# Patient Record
Sex: FEMALE | Race: WHITE | NOT HISPANIC OR LATINO | Employment: FULL TIME | URBAN - METROPOLITAN AREA
[De-identification: names, ages, dates, MRNs, and addresses within clinical notes are randomized per-mention and may not be internally consistent; named-entity substitution may affect disease eponyms.]

---

## 2017-08-15 ENCOUNTER — OFFICE VISIT (OUTPATIENT)
Dept: URGENT CARE | Facility: PHYSICIAN GROUP | Age: 27
End: 2017-08-15
Payer: COMMERCIAL

## 2017-08-15 VITALS
HEIGHT: 63 IN | DIASTOLIC BLOOD PRESSURE: 62 MMHG | RESPIRATION RATE: 14 BRPM | SYSTOLIC BLOOD PRESSURE: 104 MMHG | HEART RATE: 104 BPM | BODY MASS INDEX: 22.15 KG/M2 | WEIGHT: 125 LBS | OXYGEN SATURATION: 96 % | TEMPERATURE: 98.1 F

## 2017-08-15 DIAGNOSIS — N30.00 ACUTE CYSTITIS WITHOUT HEMATURIA: ICD-10-CM

## 2017-08-15 DIAGNOSIS — R30.0 DYSURIA: ICD-10-CM

## 2017-08-15 LAB
APPEARANCE UR: NORMAL
BILIRUB UR STRIP-MCNC: NORMAL MG/DL
COLOR UR AUTO: NORMAL
GLUCOSE UR STRIP.AUTO-MCNC: NORMAL MG/DL
INT CON NEG: NORMAL
INT CON POS: NORMAL
KETONES UR STRIP.AUTO-MCNC: NORMAL MG/DL
LEUKOCYTE ESTERASE UR QL STRIP.AUTO: NORMAL
NITRITE UR QL STRIP.AUTO: NORMAL
PH UR STRIP.AUTO: NORMAL [PH] (ref 5–8)
POC URINE PREGNANCY TEST: NORMAL
PROT UR QL STRIP: NORMAL MG/DL
RBC UR QL AUTO: NORMAL
SP GR UR STRIP.AUTO: 1.01
UROBILINOGEN UR STRIP-MCNC: NORMAL MG/DL

## 2017-08-15 PROCEDURE — 99214 OFFICE O/P EST MOD 30 MIN: CPT | Performed by: PHYSICIAN ASSISTANT

## 2017-08-15 PROCEDURE — 81002 URINALYSIS NONAUTO W/O SCOPE: CPT | Performed by: PHYSICIAN ASSISTANT

## 2017-08-15 PROCEDURE — 81025 URINE PREGNANCY TEST: CPT | Performed by: PHYSICIAN ASSISTANT

## 2017-08-15 RX ORDER — NITROFURANTOIN 25; 75 MG/1; MG/1
100 CAPSULE ORAL EVERY 12 HOURS
Qty: 10 CAP | Refills: 0 | Status: SHIPPED | OUTPATIENT
Start: 2017-08-15 | End: 2017-08-20

## 2017-08-15 ASSESSMENT — ENCOUNTER SYMPTOMS
CHILLS: 0
FLANK PAIN: 0
DIARRHEA: 0
ABDOMINAL PAIN: 0
VOMITING: 0
NAUSEA: 0
CARDIOVASCULAR NEGATIVE: 1
RESPIRATORY NEGATIVE: 1

## 2017-08-15 NOTE — PROGRESS NOTES
"Subjective:      Stephani Sparrow is a 27 y.o. female who presents with Dysuria            Dysuria   This is a new problem. The current episode started yesterday. The problem occurs every urination. The problem has been gradually worsening. The quality of the pain is described as burning. There has been no fever. There is a history of pyelonephritis. Associated symptoms include frequency and urgency. Pertinent negatives include no chills, flank pain, hematuria, nausea or vomiting. She has tried NSAIDs (Azo) for the symptoms. The treatment provided mild relief. There is no history of kidney stones or recurrent UTIs.         PMH:  has no past medical history on file.  MEDS:   Current outpatient prescriptions:   •  oxycodone-acetaminophen (PERCOCET) 5-325 MG Tab, Take 1-2 Tabs by mouth every four hours as needed for Moderate Pain ((Pain Scale 4-6)). (Patient not taking: Reported on 8/15/2017), Disp: 30 Tab, Rfl: 0  •  ibuprofen (MOTRIN) 600 MG Tab, Take 1 Tab by mouth every 6 hours as needed (Cramping). (Patient not taking: Reported on 8/15/2017), Disp: 30 Tab, Rfl: 3  •  docusate sodium 100 MG Cap, Take 100 mg by mouth 2 times a day as needed for Constipation. (Patient not taking: Reported on 8/15/2017), Disp: 60 Cap, Rfl: 3  •  polyethylene glycol/lytes (MIRALAX) PACK, Take 17 g by mouth every 24 hours as needed. Stool softener    Take while on pain meds.   Indications: Treatment for the Prevention of Constipation, Disp: , Rfl:   ALLERGIES:   Allergies   Allergen Reactions   • Pineapple      Throught gets \"fuzzy and gets tight\" per pt     SURGHX:   Past Surgical History   Procedure Laterality Date   • Other     • Appendectomy laparoscopic  4/4/2015     by Dr Whitaker   • Appendectomy laparoscopic  4/4/2015     Performed by Yang Whitaker M.D. at [facility]   • Primary c section  9/3/2016     Procedure: PRIMARY C SECTION;  Surgeon: Jase Murray M.D.;  Location: LABOR AND DELIVERY;  Service:  " "    SOCHX:  reports that she has never smoked. She has never used smokeless tobacco. She reports that she does not drink alcohol or use illicit drugs.  FH: family history is not on file.      Review of Systems   Constitutional: Negative for chills.   Respiratory: Negative.    Cardiovascular: Negative.    Gastrointestinal: Negative for nausea, vomiting, abdominal pain and diarrhea.   Genitourinary: Positive for dysuria, urgency and frequency. Negative for hematuria and flank pain.       Medications, Allergies, and current problem list reviewed today in Epic     Objective:     /62 mmHg  Pulse 104  Temp(Src) 36.7 °C (98.1 °F)  Resp 14  Ht 1.6 m (5' 3\")  Wt 56.7 kg (125 lb)  BMI 22.15 kg/m2  SpO2 96%     Physical Exam   Constitutional: She is oriented to person, place, and time. She appears well-developed and well-nourished. No distress.   HENT:   Head: Normocephalic and atraumatic.   Right Ear: External ear normal.   Left Ear: External ear normal.   Eyes: Conjunctivae and EOM are normal. Right eye exhibits no discharge. Left eye exhibits no discharge.   Neck: Normal range of motion. Neck supple.   Cardiovascular: Normal rate, regular rhythm and normal heart sounds.    Pulmonary/Chest: Effort normal and breath sounds normal. No respiratory distress. She has no wheezes.   Musculoskeletal:   No flank pain bilaterally   Neurological: She is alert and oriented to person, place, and time.   Skin: Skin is warm and dry. She is not diaphoretic.   Psychiatric: She has a normal mood and affect. Her behavior is normal. Judgment and thought content normal.   Nursing note and vitals reviewed.         Urinalysis: Patient took AZO  Pregnancy: Negative     Assessment/Plan:     1. Acute cystitis without hematuria [N30.00]  nitrofurantoin monohydr macro (MACROBID) 100 MG Cap   2. Dysuria  POCT Urinalysis    POCT PREGNANCY     Vitals normal, no signs of pyelonephritis  Take full course of antibiotic  Significantly increase " fluids  OTC Motrin or Azo as needed  Cranberry as tolerated  Return to clinic or go to ED if symptoms worsen or persist. Indications for ED discussed at length. Patient voices understanding. Follow-up with your primary care provider in 3-5 days. Red flags discussed.    Please note that this dictation was created using voice recognition software. I have made every reasonable attempt to correct obvious errors, but I expect that there are errors of grammar and possibly content that I did not discover before finalizing the note.

## 2017-08-15 NOTE — MR AVS SNAPSHOT
"        Stephani FariasDarrion   8/15/2017 10:30 AM   Office Visit   MRN: [id number]    Department:  [facility]   Dept Phone:  [phone]    Description:  Female : 1990   Provider:  Wilton Regalado PA-C           Reason for Visit     Dysuria started yesterday, also frequency and urgency      Allergies as of 8/15/2017     Allergen Noted Reactions    Pineapple 2015       Throught gets \"fuzzy and gets tight\" per pt      You were diagnosed with     Acute cystitis without hematuria   [415350]       Dysuria   [788.1.ICD-9-CM]         Vital Signs     Blood Pressure Pulse Temperature Respirations Height Weight    104/62 mmHg 104 36.7 °C (98.1 °F) 14 1.6 m (5' 3\") 56.7 kg (125 lb)    Body Mass Index Oxygen Saturation Smoking Status             22.15 kg/m2 96% Never Smoker          Basic Information     Date Of Birth Sex Race Ethnicity Preferred Language    1990 Female White Non- English      Problem List              ICD-10-CM Priority Class Noted - Resolved    S/P laparoscopic appendectomy Z90.49   2015 - Present    Labor and delivery indication for care or intervention O75.9   2016 - Present      Health Maintenance        Date Due Completion Dates    IMM DTaP/Tdap/Td Vaccine (1 - Tdap) 2007 ---    PAP SMEAR 2009 ---    IMM INFLUENZA (1) 2017 ---            Results     POCT Urinalysis      Component Value Standard Range & Units    POC Color orange Negative    POC Appearance cloudy Negative    POC Leukocyte Esterase 3+ Negative    POC Nitrites  Negative    pt is on AZO unable to obtain    POC Urobiligen  Negative (0.2) mg/dL    pt is on AZO unable to obtain    POC Protein  Negative mg/dL    pt is on AZO unable to obtain    POC Urine PH  5.0 - 8.0    pt is on AZO unable to obtain    POC Blood 3+ Negative    POC Specific Gravity 1.015 <1.005 - >1.030    POC Ketones  Negative mg/dL    pt is on AZO unable to obtain    POC Biliruben  Negative mg/dL    pt is on AZO unable " to obtain    POC Glucose neg Negative mg/dL                POCT PREGNANCY      Component Value Standard Range & Units    POC Urine Pregnancy Test NEG Negative    Internal Control Positive Valid     Internal Control Negative Valid                         Current Immunizations     MMR/Varicella Combined Vaccine  Incomplete    Tdap Vaccine  Incomplete      Below and/or attached are the medications your provider expects you to take. Review all of your home medications and newly ordered medications with your provider and/or pharmacist. Follow medication instructions as directed by your provider and/or pharmacist. Please keep your medication list with you and share with your provider. Update the information when medications are discontinued, doses are changed, or new medications (including over-the-counter products) are added; and carry medication information at all times in the event of emergency situations     Allergies:  PINEAPPLE - (reactions not documented)               Medications  Valid as of: August 15, 2017 -  1:20 PM    Generic Name Brand Name Tablet Size Instructions for use    Docusate Sodium (Cap)  MG Take 100 mg by mouth 2 times a day as needed for Constipation.        Ibuprofen (Tab) MOTRIN 600 MG Take 1 Tab by mouth every 6 hours as needed (Cramping).        Nitrofurantoin Monohyd Macro (Cap) MACROBID 100 MG Take 1 Cap by mouth every 12 hours for 5 days.        Oxycodone-Acetaminophen (Tab) PERCOCET 5-325 MG Take 1-2 Tabs by mouth every four hours as needed for Moderate Pain ((Pain Scale 4-6)).        Polyethylene Glycol 3350 (Pack) MIRALAX  Take 17 g by mouth every 24 hours as needed. Stool softener    Take while on pain meds.   Indications: Treatment for the Prevention of Constipation        .                 Medicines prescribed today were sent to:     [id number] - [place] - [address]    [address] [address]    Phone: [phone] Fax: [phone]    [place] [address]  Hours?: No      Medication refill instructions:       If your prescription bottle indicates you have medication refills left, it is not necessary to call your provider’s office. Please contact your pharmacy and they will refill your medication.    If your prescription bottle indicates you do not have any refills left, you may request refills at any time through one of the following ways: The online [employer] system (except Urgent Care), by calling your provider’s office, or by asking your pharmacy to contact your provider’s office with a refill request. Medication refills are processed only during regular business hours and may not be available until the next business day. Your provider may request additional information or to have a follow-up visit with you prior to refilling your medication.   *Please Note: Medication refills are assigned a new Rx number when refilled electronically. Your pharmacy may indicate that no refills were authorized even though a new prescription for the same medication is available at the pharmacy. Please request the medicine by name with the pharmacy before contacting your provider for a refill.           [employer] Access Code: [email or web address]  Expires: 8/22/2017  3:58 AM    [employer]  A secure, online tool to manage your health information     [facility]’s [employer]® is a secure, online tool that connects you to your personalized health information from the privacy of your home -- day or night - making it very easy for you to manage your healthcare. Once the activation process is completed, you can even access your medical information using the [employer] aric, which is available for free in the Apple Aric store or Google Play store.     [employer] provides the following levels of access (as shown below):   My Chart Features   [facility] Doctor [facility]  Specialists [facility]  Urgent  Care Non-[facility]  Primary Care  Doctor   Email your healthcare team securely and privately 24/7 X X X     Manage appointments: schedule your next appointment; view details of past/upcoming appointments X      Request prescription refills. X      View recent personal medical records, including lab and immunizations X X X X   View health record, including health history, allergies, medications X X X X   Read reports about your outpatient visits, procedures, consult and ER notes X X X X   See your discharge summary, which is a recap of your hospital and/or ER visit that includes your diagnosis, lab results, and care plan. X X       How to register for [employer]:  1. Go to  [email or web address].  2. Click on the Sign Up Now box, which takes you to the New Member Sign Up page. You will need to provide the following information:  a. Enter your [employer]ame Access Code exactly as it appears at the top of this page. (You will not need to use this code after you’ve completed the sign-up process. If you do not sign up before the expiration date, you must request a new code.)   b. Enter your date of birth.   c. Enter your home email address.   d. Click Submit, and follow the next screen’s instructions.  3. Create a Shuame ID. This will be your [employer]ame login ID and cannot be changed, so think of one that is secure and easy to remember.  4. Create a Shuame password. You can change your password at any time.  5. Enter your Password Reset Question and Answer. This can be used at a later time if you forget your password.   6. Enter your e-mail address. This allows you to receive e-mail notifications when new information is available in Shuame.  7. Click Sign Up. You can now view your health information.    For assistance activating your [employer]ame account, call [phone]

## 2019-11-03 ENCOUNTER — OFFICE VISIT (OUTPATIENT)
Dept: URGENT CARE | Facility: PHYSICIAN GROUP | Age: 29
End: 2019-11-03
Payer: COMMERCIAL

## 2019-11-03 VITALS
HEART RATE: 91 BPM | HEIGHT: 64 IN | SYSTOLIC BLOOD PRESSURE: 110 MMHG | RESPIRATION RATE: 16 BRPM | DIASTOLIC BLOOD PRESSURE: 64 MMHG | OXYGEN SATURATION: 94 % | TEMPERATURE: 100.4 F | BODY MASS INDEX: 21.34 KG/M2 | WEIGHT: 125 LBS

## 2019-11-03 DIAGNOSIS — J40 BRONCHITIS: ICD-10-CM

## 2019-11-03 DIAGNOSIS — B34.9 VIRAL ILLNESS: ICD-10-CM

## 2019-11-03 DIAGNOSIS — J98.01 BRONCHOSPASM: ICD-10-CM

## 2019-11-03 LAB
FLUAV+FLUBV AG SPEC QL IA: NEGATIVE
INT CON NEG: NEGATIVE
INT CON POS: POSITIVE

## 2019-11-03 PROCEDURE — 87804 INFLUENZA ASSAY W/OPTIC: CPT | Performed by: FAMILY MEDICINE

## 2019-11-03 PROCEDURE — 99213 OFFICE O/P EST LOW 20 MIN: CPT | Performed by: FAMILY MEDICINE

## 2019-11-03 RX ORDER — ALBUTEROL SULFATE 90 UG/1
1-2 AEROSOL, METERED RESPIRATORY (INHALATION) EVERY 4 HOURS PRN
Qty: 1 INHALER | Refills: 0 | Status: SHIPPED | OUTPATIENT
Start: 2019-11-03 | End: 2020-10-23

## 2019-11-03 ASSESSMENT — ENCOUNTER SYMPTOMS
SPUTUM PRODUCTION: 1
FEVER: 1
MYALGIAS: 1
COUGH: 1

## 2019-11-03 NOTE — PATIENT INSTRUCTIONS
Bronchitis  Bronchitis is a problem of the air tubes leading to your lungs. This problem makes it hard for air to get in and out of the lungs. You may cough a lot because your air tubes are narrow. Going without care can cause lasting (chronic) bronchitis.  HOME CARE   · Drink enough fluids to keep your pee (urine) clear or pale yellow.  · Use a cool mist humidifier.  · Quit smoking if you smoke. If you keep smoking, the bronchitis might not get better.  · Only take medicine as told by your doctor.  GET HELP RIGHT AWAY IF:   · Coughing keeps you awake.  · You start to wheeze.  · You become more sick or weak.  · You have a hard time breathing or get short of breath.  · You cough up blood.  · Coughing lasts more than 2 weeks.  · You have a fever.  · Your baby is older than 3 months with a rectal temperature of 102° F (38.9° C) or higher.  · Your baby is 3 months old or younger with a rectal temperature of 100.4° F (38° C) or higher.  MAKE SURE YOU:  · Understand these instructions.  · Will watch your condition.  · Will get help right away if you are not doing well or get worse.  Document Released: 06/05/2009 Document Revised: 03/11/2013 Document Reviewed: 11/19/2010  [employer] [facility]® Patient Information ©2014 [employer].

## 2019-11-03 NOTE — PROGRESS NOTES
"Subjective:      Stephani Youngblood is a 29 y.o. female who presents with Cough (Yellow Flem , Body Aches, Fatigue, Cough,Congestion, Sore throat x  5 days)            Cough   This is a new problem. Episode onset: 5 days. The problem has been gradually improving. The cough is productive of sputum. Associated symptoms include a fever and myalgias. Pertinent negatives include no chest pain. The symptoms are aggravated by cold air. She has tried OTC cough suppressant for the symptoms. There is no history of asthma.       Review of Systems   Constitutional: Positive for fever and malaise/fatigue.   HENT: Positive for congestion.    Respiratory: Positive for cough and sputum production.    Cardiovascular: Negative for chest pain.   Musculoskeletal: Positive for myalgias.          Objective:     /64 (BP Location: Left arm, Patient Position: Sitting, BP Cuff Size: Adult)   Pulse 91   Temp 38 °C (100.4 °F) (Temporal)   Resp 16   Ht 1.613 m (5' 3.5\")   Wt 56.7 kg (125 lb)   SpO2 94%   BMI 21.80 kg/m²      Physical Exam  Vitals signs and nursing note reviewed.   Constitutional:       General: She is not in acute distress.     Appearance: She is well-developed.   HENT:      Head: Normocephalic and atraumatic.   Eyes:      Conjunctiva/sclera: Conjunctivae normal.      Pupils: Pupils are equal, round, and reactive to light.   Cardiovascular:      Rate and Rhythm: Normal rate and regular rhythm.      Heart sounds: No murmur.   Pulmonary:      Effort: Pulmonary effort is normal. No respiratory distress.      Breath sounds: Wheezing present. No rhonchi.   Abdominal:      General: There is no distension.      Palpations: Abdomen is soft.      Tenderness: There is no tenderness.   Skin:     General: Skin is warm and dry.   Neurological:      Mental Status: She is alert and oriented to person, place, and time.      Sensory: No sensory deficit.      Deep Tendon Reflexes: Reflexes are normal and symmetric.       POCT " influenza negative          Assessment/Plan:     1. Bronchitis  OTC symptom management    2. Viral illness    - POCT Influenza A/B negative    3. Bronchospasm    - albuterol 108 (90 Base) MCG/ACT Aero Soln inhalation aerosol; Inhale 1-2 Puffs by mouth every four hours as needed for Shortness of Breath.  Dispense: 1 Inhaler; Refill: 0

## 2020-07-06 ENCOUNTER — OFFICE VISIT (OUTPATIENT)
Dept: URGENT CARE | Facility: PHYSICIAN GROUP | Age: 30
End: 2020-07-06
Payer: COMMERCIAL

## 2020-07-06 ENCOUNTER — HOSPITAL ENCOUNTER (OUTPATIENT)
Dept: RADIOLOGY | Facility: MEDICAL CENTER | Age: 30
End: 2020-07-06
Attending: NURSE PRACTITIONER
Payer: COMMERCIAL

## 2020-07-06 VITALS
DIASTOLIC BLOOD PRESSURE: 68 MMHG | WEIGHT: 132.4 LBS | RESPIRATION RATE: 20 BRPM | HEIGHT: 63 IN | HEART RATE: 71 BPM | BODY MASS INDEX: 23.46 KG/M2 | SYSTOLIC BLOOD PRESSURE: 116 MMHG | TEMPERATURE: 98.6 F | OXYGEN SATURATION: 96 %

## 2020-07-06 DIAGNOSIS — S69.92XA FINGER INJURY, LEFT, INITIAL ENCOUNTER: ICD-10-CM

## 2020-07-06 PROCEDURE — 99213 OFFICE O/P EST LOW 20 MIN: CPT | Performed by: NURSE PRACTITIONER

## 2020-07-06 PROCEDURE — 73140 X-RAY EXAM OF FINGER(S): CPT | Mod: LT

## 2020-07-06 ASSESSMENT — PAIN SCALES - GENERAL: PAINLEVEL: 8=MODERATE-SEVERE PAIN

## 2020-07-06 NOTE — PROGRESS NOTES
Subjective:      Stephani Youngblood is a 30 y.o. female who presents with Finger Pain ((L) pinky, pain after catching a ball yesterday, today is bruised and swollen)    History reviewed. No pertinent past medical history.  Social History     Socioeconomic History   • Marital status:      Spouse name: Not on file   • Number of children: Not on file   • Years of education: Not on file   • Highest education level: Not on file   Occupational History   • Not on file   Social Needs   • Financial resource strain: Not on file   • Food insecurity     Worry: Not on file     Inability: Not on file   • Transportation needs     Medical: Not on file     Non-medical: Not on file   Tobacco Use   • Smoking status: Never Smoker   • Smokeless tobacco: Never Used   Substance and Sexual Activity   • Alcohol use: No   • Drug use: No   • Sexual activity: Not on file   Lifestyle   • Physical activity     Days per week: Not on file     Minutes per session: Not on file   • Stress: Not on file   Relationships   • Social connections     Talks on phone: Not on file     Gets together: Not on file     Attends Protestant service: Not on file     Active member of club or organization: Not on file     Attends meetings of clubs or organizations: Not on file     Relationship status: Not on file   • Intimate partner violence     Fear of current or ex partner: Not on file     Emotionally abused: Not on file     Physically abused: Not on file     Forced sexual activity: Not on file   Other Topics Concern   • Not on file   Social History Narrative   • Not on file     History reviewed. No pertinent family history.    Allergies: Pineapple    Patient is a 30-year-old female who presents today with complaint of injury to the left fifth finger.  She was playing kickball yesterday and caught the ball as it came down from high up in the air.  She states either her finger was hyperextended when she caught the ball or it curled under and the ball  "crushed it, she is not sure which happened.  States that she had immediate pain and felt a clicking in her finger with range of motion.  Now, she has increased soft tissue swelling with discoloration and decreased range of motion.  No other injuries.  Patient endorses mild paresthesia to the distal aspect of her finger.        Other   This is a new problem. The current episode started yesterday. The problem occurs constantly. The problem has been unchanged. Nothing aggravates the symptoms. She has tried nothing for the symptoms. The treatment provided no relief.       Review of Systems   Musculoskeletal:        Left 5th finger pain   All other systems reviewed and are negative.         Objective:     /68 (BP Location: Left arm, Patient Position: Sitting, BP Cuff Size: Adult)   Pulse 71   Temp 37 °C (98.6 °F) (Temporal)   Resp 20   Ht 1.6 m (5' 3\")   Wt 60.1 kg (132 lb 6.4 oz)   SpO2 96%   BMI 23.45 kg/m²      Physical Exam  Vitals signs reviewed.   Constitutional:       Appearance: Normal appearance.   Musculoskeletal:        Hands:       Comments: Soft tissue swelling and contusion noted.  Decreased range of motion secondary to swelling.  There is no pain over the fifth MCP joint, no pain at the distal aspect of the finger.  Proximal aspect of the finger is swollen and there is tenderness over the PIP joint.  Capillary refill is less than 2 seconds, digit is pink and warm.   Skin:     General: Skin is warm and dry.      Capillary Refill: Capillary refill takes less than 2 seconds.   Neurological:      Mental Status: She is alert and oriented to person, place, and time.   Psychiatric:         Mood and Affect: Mood normal.         Behavior: Behavior normal.         Thought Content: Thought content normal.         Judgment: Judgment normal.       X-ray, finger:    7/6/2020 9:16 AM     HISTORY/REASON FOR EXAM:  Pain/Deformity Following Trauma.  Left 5th finger pain after injury.     TECHNIQUE/EXAM " DESCRIPTION AND NUMBER OF VIEWS:  3 views of the LEFT fingers.     COMPARISON: None     FINDINGS:  There is a subacute fracture at the volar base of the 5th middle phalanx.     Frontal view of the hand is otherwise within normal limits.     Alignment is normal.     No degenerative changes are present.     IMPRESSION:     Subacute avulsion type fracture at the volar base of the 5th middle phalanx          Assessment/Plan:   Left 5th finger fracture    Finger splint applied  Follow up with orthropedics  Ibuprofen as needed for pain  Ice  Follow up in UC as needed for any further questions or concerns.     There are no diagnoses linked to this encounter.

## 2020-09-12 ENCOUNTER — HOSPITAL ENCOUNTER (OUTPATIENT)
Dept: LAB | Facility: MEDICAL CENTER | Age: 30
End: 2020-09-12
Attending: FAMILY MEDICINE
Payer: COMMERCIAL

## 2020-09-12 LAB
ALBUMIN SERPL BCP-MCNC: 4.5 G/DL (ref 3.2–4.9)
ALBUMIN/GLOB SERPL: 1.6 G/DL
ALP SERPL-CCNC: 53 U/L (ref 30–99)
ALT SERPL-CCNC: 11 U/L (ref 2–50)
AMYLASE SERPL-CCNC: 68 U/L (ref 20–103)
ANION GAP SERPL CALC-SCNC: 11 MMOL/L (ref 7–16)
APPEARANCE UR: CLEAR
AST SERPL-CCNC: 14 U/L (ref 12–45)
BASOPHILS # BLD AUTO: 0.5 % (ref 0–1.8)
BASOPHILS # BLD: 0.03 K/UL (ref 0–0.12)
BILIRUB SERPL-MCNC: 1.2 MG/DL (ref 0.1–1.5)
BILIRUB UR QL STRIP.AUTO: NEGATIVE
BUN SERPL-MCNC: 10 MG/DL (ref 8–22)
CALCIUM SERPL-MCNC: 9.1 MG/DL (ref 8.5–10.5)
CHLORIDE SERPL-SCNC: 102 MMOL/L (ref 96–112)
CHOLEST SERPL-MCNC: 183 MG/DL (ref 100–199)
CO2 SERPL-SCNC: 25 MMOL/L (ref 20–33)
COLOR UR: YELLOW
CREAT SERPL-MCNC: 0.79 MG/DL (ref 0.5–1.4)
EOSINOPHIL # BLD AUTO: 0.43 K/UL (ref 0–0.51)
EOSINOPHIL NFR BLD: 7.5 % (ref 0–6.9)
ERYTHROCYTE [DISTWIDTH] IN BLOOD BY AUTOMATED COUNT: 42.3 FL (ref 35.9–50)
FASTING STATUS PATIENT QL REPORTED: NORMAL
GLOBULIN SER CALC-MCNC: 2.8 G/DL (ref 1.9–3.5)
GLUCOSE SERPL-MCNC: 88 MG/DL (ref 65–99)
GLUCOSE UR STRIP.AUTO-MCNC: NEGATIVE MG/DL
HCT VFR BLD AUTO: 46 % (ref 37–47)
HDLC SERPL-MCNC: 57 MG/DL
HGB BLD-MCNC: 15.3 G/DL (ref 12–16)
IMM GRANULOCYTES # BLD AUTO: 0.01 K/UL (ref 0–0.11)
IMM GRANULOCYTES NFR BLD AUTO: 0.2 % (ref 0–0.9)
KETONES UR STRIP.AUTO-MCNC: NEGATIVE MG/DL
LDLC SERPL CALC-MCNC: 113 MG/DL
LEUKOCYTE ESTERASE UR QL STRIP.AUTO: NEGATIVE
LIPASE SERPL-CCNC: 22 U/L (ref 11–82)
LYMPHOCYTES # BLD AUTO: 1.79 K/UL (ref 1–4.8)
LYMPHOCYTES NFR BLD: 31 % (ref 22–41)
MCH RBC QN AUTO: 29.9 PG (ref 27–33)
MCHC RBC AUTO-ENTMCNC: 33.3 G/DL (ref 33.6–35)
MCV RBC AUTO: 90 FL (ref 81.4–97.8)
MICRO URNS: NORMAL
MONOCYTES # BLD AUTO: 0.35 K/UL (ref 0–0.85)
MONOCYTES NFR BLD AUTO: 6.1 % (ref 0–13.4)
NEUTROPHILS # BLD AUTO: 3.16 K/UL (ref 2–7.15)
NEUTROPHILS NFR BLD: 54.7 % (ref 44–72)
NITRITE UR QL STRIP.AUTO: NEGATIVE
NRBC # BLD AUTO: 0 K/UL
NRBC BLD-RTO: 0 /100 WBC
PH UR STRIP.AUTO: 6.5 [PH] (ref 5–8)
PLATELET # BLD AUTO: 243 K/UL (ref 164–446)
PMV BLD AUTO: 9.7 FL (ref 9–12.9)
POTASSIUM SERPL-SCNC: 3.7 MMOL/L (ref 3.6–5.5)
PROT SERPL-MCNC: 7.3 G/DL (ref 6–8.2)
PROT UR QL STRIP: NEGATIVE MG/DL
RBC # BLD AUTO: 5.11 M/UL (ref 4.2–5.4)
RBC UR QL AUTO: NEGATIVE
SODIUM SERPL-SCNC: 138 MMOL/L (ref 135–145)
SP GR UR STRIP.AUTO: 1.01
T3FREE SERPL-MCNC: 3.11 PG/ML (ref 2–4.4)
T4 FREE SERPL-MCNC: 1.2 NG/DL (ref 0.93–1.7)
TRIGL SERPL-MCNC: 66 MG/DL (ref 0–149)
TSH SERPL DL<=0.005 MIU/L-ACNC: 2.66 UIU/ML (ref 0.38–5.33)
UROBILINOGEN UR STRIP.AUTO-MCNC: 0.2 MG/DL
WBC # BLD AUTO: 5.8 K/UL (ref 4.8–10.8)

## 2020-09-12 PROCEDURE — 83690 ASSAY OF LIPASE: CPT

## 2020-09-12 PROCEDURE — 82785 ASSAY OF IGE: CPT

## 2020-09-12 PROCEDURE — 83516 IMMUNOASSAY NONANTIBODY: CPT | Mod: 91

## 2020-09-12 PROCEDURE — 84443 ASSAY THYROID STIM HORMONE: CPT

## 2020-09-12 PROCEDURE — 86003 ALLG SPEC IGE CRUDE XTRC EA: CPT | Mod: 91

## 2020-09-12 PROCEDURE — 80053 COMPREHEN METABOLIC PANEL: CPT

## 2020-09-12 PROCEDURE — 85025 COMPLETE CBC W/AUTO DIFF WBC: CPT

## 2020-09-12 PROCEDURE — 81003 URINALYSIS AUTO W/O SCOPE: CPT

## 2020-09-12 PROCEDURE — 83013 H PYLORI (C-13) BREATH: CPT

## 2020-09-12 PROCEDURE — 36415 COLL VENOUS BLD VENIPUNCTURE: CPT

## 2020-09-12 PROCEDURE — 82150 ASSAY OF AMYLASE: CPT

## 2020-09-12 PROCEDURE — 80061 LIPID PANEL: CPT

## 2020-09-12 PROCEDURE — 84439 ASSAY OF FREE THYROXINE: CPT

## 2020-09-12 PROCEDURE — 84481 FREE ASSAY (FT-3): CPT

## 2020-09-14 LAB — UREA BREATH TEST QL: NEGATIVE

## 2020-09-15 LAB — TTG IGA SER IA-ACNC: <2 U/ML (ref 0–3)

## 2020-09-16 LAB — TTG IGG SER IA-ACNC: 3 U/ML (ref 0–5)

## 2020-09-18 LAB
ALMOND IGE QN: <0.1 KU/L
AVOCADO IGE QN: 0.4 KU/L
BANANA IGE QN: 0.2 KU/L
CELERY IGE QN: <0.1 KU/L
CHESTNUT IGE QN: <0.1 KU/L
COCONUT IGE QN: <0.1 KU/L
COW MILK IGE QN: 0.13 KU/L
DEPRECATED MISC ALLERGEN IGE RAST QL: NORMAL
EGG WHITE IGE QN: 0.19 KU/L
GRAPE IGE QN: <0.1 KU/L
IGE SERPL-ACNC: 37 KU/L
KIWIFRUIT IGE QN: <0.1 KU/L
OAT IGE QN: <0.1 KU/L
PAPAYA IGE QN: <0.1 KU/L
PEANUT IGE QN: <0.1 KU/L
PECAN/HICK NUT IGE QN: <0.1 KU/L
POTATO IGE QN: <0.1 KU/L
SESAME SEED IGE QN: <0.1 KU/L
SOYBEAN IGE QN: <0.1 KU/L
TOMATO IGE QN: 0.4 KU/L
WATERMELON IGE QN: <0.1 KU/L
WHEAT IGE QN: 0.13 KU/L

## 2020-10-16 ENCOUNTER — TELEPHONE (OUTPATIENT)
Dept: SCHEDULING | Facility: IMAGING CENTER | Age: 30
End: 2020-10-16

## 2020-10-23 ENCOUNTER — OFFICE VISIT (OUTPATIENT)
Dept: MEDICAL GROUP | Age: 30
End: 2020-10-23
Payer: COMMERCIAL

## 2020-10-23 VITALS
DIASTOLIC BLOOD PRESSURE: 72 MMHG | TEMPERATURE: 99.1 F | HEIGHT: 64 IN | SYSTOLIC BLOOD PRESSURE: 110 MMHG | WEIGHT: 129 LBS | BODY MASS INDEX: 22.02 KG/M2 | HEART RATE: 86 BPM | OXYGEN SATURATION: 95 %

## 2020-10-23 DIAGNOSIS — R10.13 EPIGASTRIC DISCOMFORT: ICD-10-CM

## 2020-10-23 DIAGNOSIS — K21.9 GASTROESOPHAGEAL REFLUX DISEASE, UNSPECIFIED WHETHER ESOPHAGITIS PRESENT: ICD-10-CM

## 2020-10-23 DIAGNOSIS — R14.0 BLOATING: ICD-10-CM

## 2020-10-23 DIAGNOSIS — Z71.85 IMMUNIZATION COUNSELING: ICD-10-CM

## 2020-10-23 DIAGNOSIS — Z00.00 HEALTH CARE MAINTENANCE: ICD-10-CM

## 2020-10-23 DIAGNOSIS — R11.0 NAUSEA: ICD-10-CM

## 2020-10-23 PROCEDURE — 99214 OFFICE O/P EST MOD 30 MIN: CPT | Performed by: INTERNAL MEDICINE

## 2020-10-23 RX ORDER — OMEPRAZOLE 20 MG/1
20 CAPSULE, DELAYED RELEASE ORAL DAILY
Qty: 90 CAP | Refills: 0 | Status: SHIPPED | OUTPATIENT
Start: 2020-10-23 | End: 2021-01-13 | Stop reason: SDUPTHER

## 2020-10-23 ASSESSMENT — FIBROSIS 4 INDEX: FIB4 SCORE: 0.56

## 2020-10-23 ASSESSMENT — PATIENT HEALTH QUESTIONNAIRE - PHQ9: CLINICAL INTERPRETATION OF PHQ2 SCORE: 0

## 2020-10-23 NOTE — PROGRESS NOTES
CHIEF COMPLAINT  Chief Complaint   Patient presents with   • Establish Care   • Lab Results, epigastric pain     HPI  Stephani Youngblood is a 30 y.o. female who presents today for the following     Epigastric discomfort, bloating, N  31 y/o, F, with h/o intermittent epigastric discomfort, pain, bloating and N for > 1 year.  She used ibuprofen few days a week.    Denies:   -  vomiting, or diarrhea  - dysphagia  - abnormal cough  - blood in the stool or dark tarry stools.  - early satiety  - tobacco use.    H. pylory breath test was negative on 9/12/2020    CT abdomen/pelvis, 4/4/2015:  Negative.    Hypercholesterolemia  The patient had borderline high LDL cholesterol, high HDL.  Diet: Regular  Exercise: At least 4 days in a week  BMI: 22  FH: mother    Reviewed PMH, PSH, FH, SH, ALL, HCM/IMM  Reviewed MEDS    Patient Active Problem List    Diagnosis Date Noted   • Gastroesophageal reflux disease 10/23/2020   • Epigastric discomfort 10/23/2020   • Bloating 10/23/2020   • Nausea 10/23/2020   • Health care maintenance 10/23/2020   • Immunization counseling 10/23/2020   • Labor and delivery indication for care or intervention 09/06/2016   • S/P laparoscopic appendectomy 04/04/2015     CURRENT MEDICATIONS  Current Outpatient Medications   Medication Sig Dispense Refill   • omeprazole (PRILOSEC) 20 MG delayed-release capsule Take 1 Cap by mouth every day. 90 Cap 0   • ibuprofen (MOTRIN) 600 MG Tab Take 1 Tab by mouth every 6 hours as needed (Cramping). 30 Tab 3     No current facility-administered medications for this visit.      ALLERGIES  Allergies: Pineapple  PAST MEDICAL HISTORY  Past Medical History:   Diagnosis Date   • Constipation    • GERD (gastroesophageal reflux disease)      SURGICAL HISTORY  She  has a past surgical history that includes other; appendectomy laparoscopic (4/4/2015); appendectomy laparoscopic (4/4/2015); and primary c section (9/3/2016).  SOCIAL HISTORY  Social History     Tobacco Use   •  "Smoking status: Never Smoker   • Smokeless tobacco: Never Used   Substance Use Topics   • Alcohol use: No   • Drug use: No     Social History     Social History Narrative   • Not on file     FAMILY HISTORY  Family History   Problem Relation Age of Onset   • Cancer Maternal Aunt         stomach   • Hyperlipidemia Mother    • Hypertension Mother    • Hypertension Father    • Diabetes Brother      Family Status   Relation Name Status   • Luz  (Not Specified)   • Mo  Alive   • Fa  Alive   • Bro  (Not Specified)     ROS   Constitutional: Negative for fever, chills, fatigue.  HENT: Negative for congestion, sore throat.  Eyes: Negative for vision problems.   Respiratory: Negative for cough, shortness of breath.  Cardiovascular: Negative for chest pain, palpitations.   Gastrointestinal: as above.   Genitourinary: Negative for dysuria.  Musculoskeletal: Negative for significant myalgia, back and joint pain.   Skin: Negative for rash.   Neuro: Negative for dizziness, weakness and headaches.   Endo/Heme/Allergies: Does not bruise/bleed easily.   Psychiatric/Behavioral: Negative for depression.    PHYSICAL EXAM   Blood Pressure 110/72 (BP Location: Right arm, Patient Position: Sitting, BP Cuff Size: Adult)   Pulse 86   Temperature 37.3 °C (99.1 °F) (Temporal)   Height 1.613 m (5' 3.5\")   Weight 58.5 kg (129 lb)   Oxygen Saturation 95%  Body mass index is 22.49 kg/m².  General:  NAD, well appearing  HEENT:   NC/AT, PERRLA, EOMI.  Cardiovascular: unlabored breathing, no peripheral cyanosis or swelling.     Lungs:   no respiratory distress.  Abdomen: non- distended.  Extremities:  No LE swelling.  Skin:  Warm, dry.  No erythema. No rash.   Neurologic: Alert & oriented x 3. CN II-XII grossly intact. No focal deficits.  Psychiatric:  Affect normal, mood normal, judgment normal.    Labs     Labs are reviewed and discussed with a patient  Lab Results   Component Value Date/Time    THERESA 183 09/12/2020 08:17 AM     " (H) 09/12/2020 08:17 AM    HDL 57 09/12/2020 08:17 AM    TRIGLYCERIDE 66 09/12/2020 08:17 AM       Lab Results   Component Value Date/Time    SODIUM 138 09/12/2020 08:17 AM    POTASSIUM 3.7 09/12/2020 08:17 AM    CHLORIDE 102 09/12/2020 08:17 AM    CO2 25 09/12/2020 08:17 AM    GLUCOSE 88 09/12/2020 08:17 AM    BUN 10 09/12/2020 08:17 AM    CREATININE 0.79 09/12/2020 08:17 AM     Lab Results   Component Value Date/Time    ALKPHOSPHAT 53 09/12/2020 08:17 AM    ASTSGOT 14 09/12/2020 08:17 AM    ALTSGPT 11 09/12/2020 08:17 AM    TBILIRUBIN 1.2 09/12/2020 08:17 AM      No results found for: HBA1C  No results found for: TSH  Lab Results   Component Value Date/Time    FREET4 1.20 09/12/2020 08:17 AM     Lab Results   Component Value Date/Time    WBC 5.8 09/12/2020 08:17 AM    RBC 5.11 09/12/2020 08:17 AM    HEMOGLOBIN 15.3 09/12/2020 08:17 AM    HEMATOCRIT 46.0 09/12/2020 08:17 AM    MCV 90.0 09/12/2020 08:17 AM    MCH 29.9 09/12/2020 08:17 AM    MCHC 33.3 (L) 09/12/2020 08:17 AM    MPV 9.7 09/12/2020 08:17 AM    NEUTSPOLYS 54.70 09/12/2020 08:17 AM    LYMPHOCYTES 31.00 09/12/2020 08:17 AM    MONOCYTES 6.10 09/12/2020 08:17 AM    EOSINOPHILS 7.50 (H) 09/12/2020 08:17 AM    BASOPHILS 0.50 09/12/2020 08:17 AM      Imaging     Reviewed and discussed per HPI    Assessment and Plan     Stephani Youngblood is a 30 y.o. female    1. Gastroesophageal reflux disease, unspecified whether esophagitis present, negative h/ pilory breath test  Advised to stop ibuprofen  Start:  - omeprazole (PRILOSEC) 20 MG delayed-release capsule; Take 1 Cap by mouth every day.  Dispense: 90 Cap; Refill: 0  - REFERRAL TO GASTROENTEROLOGY if persistent    2. Epigastric discomfort  As above  - omeprazole (PRILOSEC) 20 MG delayed-release capsule; Take 1 Cap by mouth every day.  Dispense: 90 Cap; Refill: 0  - REFERRAL TO GASTROENTEROLOGY  3. Bloating  - omeprazole (PRILOSEC) 20 MG delayed-release capsule; Take 1 Cap by mouth every day.  Dispense: 90  Cap; Refill: 0  - REFERRAL TO GASTROENTEROLOGY  4. Nausea  - omeprazole (PRILOSEC) 20 MG delayed-release capsule; Take 1 Cap by mouth every day.  Dispense: 90 Cap; Refill: 0  - REFERRAL TO GASTROENTEROLOGY    5. Health care maintenance  She scheduled PAP    6. Immunization counseling  UTD    Counseling:   - Smoking:  Nonsmoker    Followup: Return if symptoms worsen or fail to improve.    All questions are answered.    Please note that this dictation was created using voice recognition software, and that there might be errors of talha and possibly content.

## 2020-11-25 ENCOUNTER — TELEPHONE (OUTPATIENT)
Dept: MEDICAL GROUP | Age: 30
End: 2020-11-25

## 2020-11-25 NOTE — TELEPHONE ENCOUNTER
Phone Number Called: [phone] (home)       Call outcome: Spoke to patient regarding message below.    Message: Called patient back. She has not heard from her GI referral. I gave her there phone number to call and find out what what happened to her referral. I asked her to call if she needed anything else.

## 2021-01-13 DIAGNOSIS — R14.0 BLOATING: ICD-10-CM

## 2021-01-13 DIAGNOSIS — R10.13 EPIGASTRIC DISCOMFORT: ICD-10-CM

## 2021-01-13 DIAGNOSIS — R11.0 NAUSEA: ICD-10-CM

## 2021-01-13 DIAGNOSIS — K21.9 GASTROESOPHAGEAL REFLUX DISEASE, UNSPECIFIED WHETHER ESOPHAGITIS PRESENT: ICD-10-CM

## 2021-01-13 RX ORDER — OMEPRAZOLE 20 MG/1
20 CAPSULE, DELAYED RELEASE ORAL DAILY
Qty: 90 CAP | Refills: 3 | Status: SHIPPED | OUTPATIENT
Start: 2021-01-13

## 2023-10-05 ENCOUNTER — APPOINTMENT (OUTPATIENT)
Dept: URGENT CARE | Facility: PHYSICIAN GROUP | Age: 33
End: 2023-10-05
Payer: COMMERCIAL